# Patient Record
Sex: FEMALE
[De-identification: names, ages, dates, MRNs, and addresses within clinical notes are randomized per-mention and may not be internally consistent; named-entity substitution may affect disease eponyms.]

---

## 2021-07-12 ENCOUNTER — TRANSCRIPTION ENCOUNTER (OUTPATIENT)
Age: 66
End: 2021-07-12

## 2021-07-21 ENCOUNTER — TRANSCRIPTION ENCOUNTER (OUTPATIENT)
Age: 66
End: 2021-07-21

## 2022-08-19 ENCOUNTER — APPOINTMENT (OUTPATIENT)
Dept: ORTHOPEDIC SURGERY | Facility: CLINIC | Age: 67
End: 2022-08-19

## 2022-08-19 VITALS — WEIGHT: 165 LBS | HEIGHT: 64 IN | BODY MASS INDEX: 28.17 KG/M2

## 2022-08-19 DIAGNOSIS — S92.354D NONDISPLACED FRACTURE OF FIFTH METATARSAL BONE, RIGHT FOOT, SUBSEQUENT ENCOUNTER FOR FRACTURE WITH ROUTINE HEALING: ICD-10-CM

## 2022-08-19 DIAGNOSIS — Z78.9 OTHER SPECIFIED HEALTH STATUS: ICD-10-CM

## 2022-08-19 PROBLEM — Z00.00 ENCOUNTER FOR PREVENTIVE HEALTH EXAMINATION: Status: ACTIVE | Noted: 2022-08-19

## 2022-08-19 PROCEDURE — 99213 OFFICE O/P EST LOW 20 MIN: CPT

## 2022-08-19 PROCEDURE — 73630 X-RAY EXAM OF FOOT: CPT | Mod: RT

## 2022-08-26 PROBLEM — Z78.9 NO PERTINENT PAST MEDICAL HISTORY: Status: RESOLVED | Noted: 2022-08-26 | Resolved: 2022-08-26

## 2022-08-26 NOTE — IMAGING
[de-identified] : On examination of her right foot, she has no erythema, no swelling no ecchymosis.  Very slight tenderness over the base of the metatarsal.  She is nontender over the rest of the metatarsals the Lisfranc joint.  She has no tenderness over the midfoot.  She has no tenderness palpation of the ankle.  She has good range of motion, 5/5 strength in the EHL and FHL, sensation intact throughout she has 2+ DP and PT pulses.\par \par   X-rays repeated in the office a the right foot, weight-bearing show a nondisplaced base of the 5th metatarsal fracture.  There is slight callus formation, the alignment is unchanged, the fracture line is still visible.

## 2022-08-26 NOTE — HISTORY OF PRESENT ILLNESS
[de-identified] : Ms. Magi Glass, a 66-year-old female, presents today for follow-up evaluation of her right base of 5th metatarsal\par fracture. She is now 3 months out from the injury. She has been weight-bearing without difficulty, she states she has no pain. She denies any new injury. She denies any pain in the ankle. She denies any numbness tingling in the foot or any calf pain

## 2022-08-26 NOTE — DISCUSSION/SUMMARY
[de-identified] :   At this time she can continue to weightbear as tolerated, she can do activity as tolerated.  I will see her back in 6 weeks for repeat x-rays and evaluation. Patient will call me if any other problems or concerns.  Patient verbalized understanding and agreed with the plan, all questions were answered in the office today.\par

## 2022-10-25 ENCOUNTER — APPOINTMENT (OUTPATIENT)
Dept: ORTHOPEDIC SURGERY | Facility: CLINIC | Age: 67
End: 2022-10-25

## 2022-12-01 ENCOUNTER — APPOINTMENT (OUTPATIENT)
Dept: UROGYNECOLOGY | Facility: CLINIC | Age: 67
End: 2022-12-01